# Patient Record
(demographics unavailable — no encounter records)

---

## 2024-10-22 NOTE — HISTORY OF PRESENT ILLNESS
[FreeTextEntry1] : Rx:  CBZ ER 200mg bid, risperdone 1mg bid, vit D, famotidine  Updates:  In interval  from prior visit: Seizures:  none reported Side effects:  none Compliance: good  Mood: stable, behavior ok with prior risperdone decrease so far >1yr, prolactin elevated Sleep: stable  ROS: GERD.  Recent thrush. Decreased walking since Left leg tendon injury since 2020  HPI:  Dillon is a 45 year old left-handed gentleman with cerebral palsy, seizures, and intellectual disability (nonverbal, limited communication) presenting for initial evaluation to establish care.   History is provided by his parents and sister.   Father states that Dillon has had seizures since he was about 15 hours old. He was born at [facility] in [place], [place], delivered at 42 weeks after a pregnancy with inadequate prenatal care per father. Labor was complicated by nonreassuring fetal heart rates, and he was delivered with forceps assistance. He had low Apgars of 5 and 7. Around 15 hours of life, he developed what father states was described as muscle twitching. He continued to have episodes of muscle twitching, and was treated with phenobarbital. He had two lumbar punctures that were unrevealing. He was noted to have a hematoma, and had a CT scan at 3 days of life, which per father showed arachnoid hemorrhage. He had EEGs done during this time, which were unrevealing. He stayed in the nursery for 11 days, before being discharged home. Father states that he was not discharged home on the phenobarbital.   He was initially followed by Dr. Abiodun Shah at [facility] for neurology, who gave him the diagnosis of CP, mental retardation and seizure disorder, and referred him to the [facility], where he underwent a metabolic work up and chromosomal studies, which were all reportedly negative. At the age of 11, he had a fall down the stairs from the kitchen to basement, and at that time, he went to [facility] ED. EEG was attempted but he did not tolerate it. He was started on Dilantin at that time.   He developed a skin rash while on Dilantin, so it was stopped. He was recommended to switch to Tegretol, but parents never made the switch.   Around the age of 16-17 years, he was referred to Dr. Edgar Turner at [facility]. At that time, he had an inpatient video EEG over several days, which demonstrated generalized slowing but no interictal abnormalities (parents provided documentation of the report). He was started on Tegretol at that time.   Three to five years after starting Tegretol and being seizure free, they attempted to wean off the medication, but he had a seizure at that time, so he was restarted on the medication.   Currently, he is on Tegretol 200 mg BID and has not had a seizure in many years.   Parents do describe episodes of staring during which he does not respond, but they are able to "snap him out of it". They describe his other seizures as "grand mal" with extremity stiffening and jerking throughout.   Per family, Dillon has always been nonverbal with limited abilities to follow commands. He never achieved certain motor milestones, and always had difficulty walking. He has been wheelchair bound throughout his adult life. He has had many falls recently while attempting to walk causing injury, which the family is particularly concerned about. He does have foot deformities of both feet. He knows when he needs to use the bathroom but needs full assistance to do so. He does use a diaper while out and overnight while sleeping.  PMH:  ELDON  myopia, retinal detachment b/l  left eye vision loss   scoliosis  hammer toe deformity

## 2024-10-22 NOTE — REVIEW OF SYSTEMS
[Seizures] : convulsions [Difficulty Walking] : difficulty walking [As Noted in HPI] : as noted in HPI

## 2024-10-22 NOTE — PHYSICAL EXAM
[General Appearance - Alert] : alert [General Appearance - In No Acute Distress] : in no acute distress [Cranial Nerves Oculomotor (III)] : extraocular motion intact [Motor Handedness Left-Handed] : the patient is left hand dominant [2+] : Brachioradialis left 2+ [0] : Ankle jerk left 0 [Past-pointing] : there was no past-pointing [Tremor] : no tremor present [Plantar Reflex Right Only] : normal on the right [Plantar Reflex Left Only] : normal on the left [___] : absent on the right [___] : absent on the left [FreeTextEntry4] : Nonverbal, limited communication, does not follow commands   [FreeTextEntry5] : Case disconjugate.  Limited medial gaze with the left eye . left pupil miotic, nonreactive, right pupil reactive to light, no gross facial asymmetry   [FreeTextEntry6] : Good strength resisting examiner, increased tone in lower extremities with contractures in bilateral knees.  Ankles hypotonic.  Clawed toes bilaterally [FreeTextEntry7] : Withdraws to light touch  [FreeTextEntry8] : Wheelchair-bound, requires assistance with walking  [FreeTextEntry9] : contractures at knees bilaterally, unable to elicit patellar reflexes

## 2024-10-22 NOTE — DATA REVIEWED
[de-identified] : 1996 video EEG: generalized slowing [de-identified] : [facility] 7-10 [id number].  9.3 2024 prolactin elevated 95, vit D 59

## 2024-10-22 NOTE — DISCUSSION/SUMMARY
[Well-controlled] : well-controlled [Generalized] : generalized  [Symptomatic] : symptomatic [Safety Recommendations] : The patient was advised in regards to the risk of seizures and general seizure safety recommendations including not to be bathing alone, climbing to high places and operating heavy machinery. [Compliance with Medications] : The importance of compliance with medications was reinforced. [Medication Side Effects] : High frequency and serious potential medication adverse effects were reviewed with the patient, including but not exclusive to psychiatric effects.  Information sheets on medication side effects were made available to the patient in our clinic.  The patient or advocate agrees to notify us for any concerns. [FreeTextEntry1] : Dillon is a 45year old M with CP, nonverbal with limited communication and seizures who presents for initial evaluation. His exam is notable for foot deformities, spasticity and bilateral knee contractures.   His seizure disorder appears to be well controlled despite the low dose of Tegretol, but with surprisingly high blood level likely reflecting some unqiue physiology/metabolism for Dillon.  Discussed with family risks of bone disease with prolonged treatment of Tegretol, and recommended chronic vitamin D supplementation.   There is concern for also a progressive gait disorder and falls, likely related to underlying spasticity with deconditioning. Recommend follow up with PM&R and physical therapy, and consideration for medication reduction if tolerable from a psychiatric standpoint.   He has permanent disability and inability to walk related to motor impairments from his underlying disease process, leading to multiple falls causing injury.   Prolactinemia noted.  Recommendation to lower risperdone as behavior is good now, or change to abilify.  If prolactin level does not correct recommend  repeat neuroimaging.  Patient not able to tolerate EEG or MRI/CT. would require sedation to proceed  In day program for adults.  x time 30min RTC 6-8mo

## 2025-02-26 NOTE — CONSULT LETTER
[Dear  ___] : Dear  [unfilled], [Consult Letter:] : I had the pleasure of evaluating your patient, [unfilled]. [Please see my note below.] : Please see my note below. [Consult Closing:] : Thank you very much for allowing me to participate in the care of this patient.  If you have any questions, please do not hesitate to contact me. [Sincerely,] : Sincerely, [FreeTextEntry3] : Mo Rashid MD, PhD\par  Chief, Division of Laryngology\par  Department of Otolaryngology\par  [facility]\par  Pediatric Otolaryngology, [facility]\par   of Otolaryngology\par  [facility] [employer]\par

## 2025-02-26 NOTE — HISTORY OF PRESENT ILLNESS
[de-identified] : 45 year old male with CP, developmental delay. History of OME, HL, sialorrhea, and LPRD. Presents for follow up evaluation.  Mom states taking famotidine 40mg once a day. Reports a decrease in pooling of saliva during the day. Reports intermittent coughing during day and night. Denies aspirations or recent hospitalizations.  Mother denies s/s of throat pain, dysphagia, odynophagia, dyspnea, dysphonia, hemoptysis, recent fevers or infections. Has not tried glycopyrrolate, patch, or botox

## 2025-02-26 NOTE — REASON FOR VISIT
[Subsequent Evaluation] : a subsequent evaluation for [Gastroesophageal Reflux] : gastroesophageal reflux [Parent] : parent [Family Member] : family member [FreeTextEntry2] : LPRD

## 2025-02-26 NOTE — ADDENDUM
[FreeTextEntry1] :  Documented by Raven Mattson acting as scribe for Dr. Rashid on 02/26/2025. All Medical record entries made by the Scribe were at my, Dr. Rashid, direction and personally dictated by me on 02/26/2025 . I have reviewed the chart and agree that the record accurately reflects my personal performance of the history, physical exam, assessment and plan. I have also personally directed, reviewed, and agreed with the discharge instructions.

## 2025-05-19 NOTE — HISTORY OF PRESENT ILLNESS
[FreeTextEntry1] : Patient is a 43-year-old left-hand-dominant male PMHx spastic cerebral palsy, ID, scoliosis, seizure disorder who is nonverbal who was last seen 2022 for a wheelchair. Patient recieved  the [employer] Stroller shortly afterward which has been functioning well in the community.    Parents main concern is how the patient sits at home and also hammertoes on the lateral aspect of the foot.  Patient frequently sits on the floor at home in a W position, no overt pain complaints.  The patient with hammertoes which is noted especially during ambulation and patient has developed callus on the fifth toes bilaterally.  No skin breakdown.  No falls reported.  Patient ambulates short distances in the home with contact-guard.  Patient has always drag both feet (right greater than left).   Patient can negotiate stairs with a handrail and supervision ascending stairs, contact card descending stairs.  Patient lives with his parents in a private house with 1 flight of steps up to his bedroom.  Patient never received AFO on the right due to patient frequently taking off his shoes and likely would not tolerate the AFO.

## 2025-05-19 NOTE — ASSESSMENT
[FreeTextEntry1] : Patient is a 42-year-old  male PMHx ID, scoliosis, seizure disorder, spastic cerebral palsy/crouched gait with gait impairments noted above.  The patient's hip exam is benign and no medial collateral ligament laxity noted at both kne.  Reassured the parents that his sitting at home in the W position has not had a negative impact on knee stability at this point.  Patient frequently takes off his shoe and likely would not tolerate an AFO.  I do not believe an AFO would enhance safety of ambulation and doubt patient would tolerate the brace.  Patient with hammertoes of fifth toe bilaterally, prescription provided for orthopedic depth inlay shoes.  Patient referred to podiatry for possible toe separator and further care.    I spent a total of 20 minutes on the date of the encounter evaluating and treating the patient including a discussion of treatment options with the patient's parents.  My time excludes teaching and/or procedures.

## 2025-05-19 NOTE — PHYSICAL EXAM
[FreeTextEntry1] : General: Patient sitting comfortably in wheelchair, pleasant, nonverbal and does not follow verbal commands. Extremities: No pedal edema.  Hammertoes noted 5th toes bilaterally (right greater than left).  Hyperostosis noted at the left lateral malleolus.  Motor: Both upper extremities: Active range of motion within functional limits with approximately 4/5 motor power throughout.  Limited cooperation. Both lower extremities: Active range of motion within functional limits at the hip and knee with approximately 4/5 motor power throughout.  Diminished tone noted at both ankles and no overt active movement present. Both hips demonstrate 80 degrees external rotation, 90 degrees internal rotation without obvious pain.   Able to passively extend both knees fully.  No effusion, no crepitus.  No ligamentous laxity, negative Lachman sign. Able to passively dorsiflex ankles approximately 10 degrees above neutral.   Muscle stretch reflexes: 0/+1 right KJ, +2 left KJ.  0 AJ and symmetric.   Functional status: Sit to stand transfer with assistance.  Patient ambulated with bilateral compensated gluteus medius gait with both knees in flexion, very short stance phase bilaterally with ankle dorsiflexion noted/crouched gait.  Bilateral FF/toe strike.  No knee valgus noted during ambulation.